# Patient Record
Sex: MALE | Race: BLACK OR AFRICAN AMERICAN | NOT HISPANIC OR LATINO | Employment: FULL TIME | URBAN - METROPOLITAN AREA
[De-identification: names, ages, dates, MRNs, and addresses within clinical notes are randomized per-mention and may not be internally consistent; named-entity substitution may affect disease eponyms.]

---

## 2019-01-06 ENCOUNTER — APPOINTMENT (OUTPATIENT)
Dept: CT IMAGING | Facility: CLINIC | Age: 34
End: 2019-01-06
Payer: COMMERCIAL

## 2019-01-06 ENCOUNTER — HOSPITAL ENCOUNTER (EMERGENCY)
Facility: CLINIC | Age: 34
Discharge: HOME | End: 2019-01-06
Attending: EMERGENCY MEDICINE | Admitting: EMERGENCY MEDICINE
Payer: COMMERCIAL

## 2019-01-06 VITALS
WEIGHT: 148 LBS | OXYGEN SATURATION: 99 % | HEIGHT: 67 IN | TEMPERATURE: 98 F | BODY MASS INDEX: 23.23 KG/M2 | RESPIRATION RATE: 20 BRPM | DIASTOLIC BLOOD PRESSURE: 57 MMHG | HEART RATE: 59 BPM | SYSTOLIC BLOOD PRESSURE: 111 MMHG

## 2019-01-06 DIAGNOSIS — R11.2 NON-INTRACTABLE VOMITING WITH NAUSEA, UNSPECIFIED VOMITING TYPE: ICD-10-CM

## 2019-01-06 DIAGNOSIS — R10.9 RIGHT SIDED ABDOMINAL PAIN: ICD-10-CM

## 2019-01-06 DIAGNOSIS — R50.9 FEVER, UNSPECIFIED FEVER CAUSE: ICD-10-CM

## 2019-01-06 LAB
ALBUMIN SERPL-MCNC: 3.4 G/DL (ref 3.4–5)
ALP SERPL-CCNC: 120 U/L (ref 40–150)
ALT SERPL W P-5'-P-CCNC: 27 U/L (ref 0–70)
ANION GAP SERPL CALCULATED.3IONS-SCNC: 8 MMOL/L (ref 3–14)
AST SERPL W P-5'-P-CCNC: 24 U/L (ref 0–45)
BASOPHILS # BLD AUTO: 0 10E9/L (ref 0–0.2)
BASOPHILS NFR BLD AUTO: 0.1 %
BILIRUB SERPL-MCNC: 0.4 MG/DL (ref 0.2–1.3)
BUN SERPL-MCNC: 8 MG/DL (ref 7–30)
CALCIUM SERPL-MCNC: 8.3 MG/DL (ref 8.5–10.1)
CHLORIDE SERPL-SCNC: 105 MMOL/L (ref 94–109)
CK SERPL-CCNC: 423 U/L (ref 30–300)
CO2 SERPL-SCNC: 26 MMOL/L (ref 20–32)
CREAT SERPL-MCNC: 0.93 MG/DL (ref 0.66–1.25)
DIFFERENTIAL METHOD BLD: NORMAL
EOSINOPHIL # BLD AUTO: 0.1 10E9/L (ref 0–0.7)
EOSINOPHIL NFR BLD AUTO: 1.4 %
ERYTHROCYTE [DISTWIDTH] IN BLOOD BY AUTOMATED COUNT: 13.3 % (ref 10–15)
GFR SERPL CREATININE-BSD FRML MDRD: >90 ML/MIN/{1.73_M2}
GLUCOSE SERPL-MCNC: 115 MG/DL (ref 70–99)
HCT VFR BLD AUTO: 45.6 % (ref 40–53)
HGB BLD-MCNC: 14.8 G/DL (ref 13.3–17.7)
IMM GRANULOCYTES # BLD: 0 10E9/L (ref 0–0.4)
IMM GRANULOCYTES NFR BLD: 0.3 %
LIPASE SERPL-CCNC: 86 U/L (ref 73–393)
LYMPHOCYTES # BLD AUTO: 1.3 10E9/L (ref 0.8–5.3)
LYMPHOCYTES NFR BLD AUTO: 14.4 %
MCH RBC QN AUTO: 28.5 PG (ref 26.5–33)
MCHC RBC AUTO-ENTMCNC: 32.5 G/DL (ref 31.5–36.5)
MCV RBC AUTO: 88 FL (ref 78–100)
MONOCYTES # BLD AUTO: 0.8 10E9/L (ref 0–1.3)
MONOCYTES NFR BLD AUTO: 9.1 %
NEUTROPHILS # BLD AUTO: 6.5 10E9/L (ref 1.6–8.3)
NEUTROPHILS NFR BLD AUTO: 74.7 %
NRBC # BLD AUTO: 0 10*3/UL
NRBC BLD AUTO-RTO: 0 /100
PLATELET # BLD AUTO: 323 10E9/L (ref 150–450)
POTASSIUM SERPL-SCNC: 3.4 MMOL/L (ref 3.4–5.3)
PROT SERPL-MCNC: 7.6 G/DL (ref 6.8–8.8)
RBC # BLD AUTO: 5.2 10E12/L (ref 4.4–5.9)
SODIUM SERPL-SCNC: 139 MMOL/L (ref 133–144)
WBC # BLD AUTO: 8.8 10E9/L (ref 4–11)

## 2019-01-06 PROCEDURE — 25000128 H RX IP 250 OP 636: Performed by: EMERGENCY MEDICINE

## 2019-01-06 PROCEDURE — 96374 THER/PROPH/DIAG INJ IV PUSH: CPT | Mod: 59

## 2019-01-06 PROCEDURE — 80053 COMPREHEN METABOLIC PANEL: CPT | Performed by: EMERGENCY MEDICINE

## 2019-01-06 PROCEDURE — 99284 EMERGENCY DEPT VISIT MOD MDM: CPT | Mod: 25

## 2019-01-06 PROCEDURE — 96361 HYDRATE IV INFUSION ADD-ON: CPT

## 2019-01-06 PROCEDURE — 83690 ASSAY OF LIPASE: CPT | Performed by: EMERGENCY MEDICINE

## 2019-01-06 PROCEDURE — 96375 TX/PRO/DX INJ NEW DRUG ADDON: CPT

## 2019-01-06 PROCEDURE — 82550 ASSAY OF CK (CPK): CPT | Performed by: EMERGENCY MEDICINE

## 2019-01-06 PROCEDURE — 85025 COMPLETE CBC W/AUTO DIFF WBC: CPT | Performed by: EMERGENCY MEDICINE

## 2019-01-06 PROCEDURE — 99285 EMERGENCY DEPT VISIT HI MDM: CPT | Mod: 25

## 2019-01-06 PROCEDURE — 74177 CT ABD & PELVIS W/CONTRAST: CPT

## 2019-01-06 RX ORDER — METOCLOPRAMIDE HYDROCHLORIDE 5 MG/ML
5 INJECTION INTRAMUSCULAR; INTRAVENOUS ONCE
Status: COMPLETED | OUTPATIENT
Start: 2019-01-06 | End: 2019-01-06

## 2019-01-06 RX ORDER — METOCLOPRAMIDE 10 MG/1
10 TABLET ORAL 4 TIMES DAILY PRN
Qty: 10 TABLET | Refills: 0 | Status: SHIPPED | OUTPATIENT
Start: 2019-01-06

## 2019-01-06 RX ORDER — KETOROLAC TROMETHAMINE 15 MG/ML
15 INJECTION, SOLUTION INTRAMUSCULAR; INTRAVENOUS ONCE
Status: COMPLETED | OUTPATIENT
Start: 2019-01-06 | End: 2019-01-06

## 2019-01-06 RX ORDER — ONDANSETRON 2 MG/ML
4 INJECTION INTRAMUSCULAR; INTRAVENOUS ONCE
Status: COMPLETED | OUTPATIENT
Start: 2019-01-06 | End: 2019-01-06

## 2019-01-06 RX ORDER — ONDANSETRON 2 MG/ML
INJECTION INTRAMUSCULAR; INTRAVENOUS
Status: DISCONTINUED
Start: 2019-01-06 | End: 2019-01-06

## 2019-01-06 RX ORDER — ONDANSETRON 2 MG/ML
4 INJECTION INTRAMUSCULAR; INTRAVENOUS ONCE
Status: DISCONTINUED | OUTPATIENT
Start: 2019-01-06 | End: 2019-01-06 | Stop reason: HOSPADM

## 2019-01-06 RX ORDER — IOPAMIDOL 755 MG/ML
500 INJECTION, SOLUTION INTRAVASCULAR ONCE
Status: COMPLETED | OUTPATIENT
Start: 2019-01-06 | End: 2019-01-06

## 2019-01-06 RX ORDER — ONDANSETRON 4 MG/1
4-8 TABLET, ORALLY DISINTEGRATING ORAL EVERY 8 HOURS PRN
Qty: 10 TABLET | Refills: 0 | Status: SHIPPED | OUTPATIENT
Start: 2019-01-06 | End: 2019-01-09

## 2019-01-06 RX ADMIN — ONDANSETRON 4 MG: 2 INJECTION INTRAMUSCULAR; INTRAVENOUS at 12:53

## 2019-01-06 RX ADMIN — ONDANSETRON 4 MG: 2 INJECTION INTRAMUSCULAR; INTRAVENOUS at 12:41

## 2019-01-06 RX ADMIN — SODIUM CHLORIDE 1000 ML: 9 INJECTION, SOLUTION INTRAVENOUS at 12:53

## 2019-01-06 RX ADMIN — SODIUM CHLORIDE 59 ML: 9 INJECTION, SOLUTION INTRAVENOUS at 14:09

## 2019-01-06 RX ADMIN — IOPAMIDOL 74 ML: 755 INJECTION, SOLUTION INTRAVENOUS at 14:09

## 2019-01-06 RX ADMIN — SODIUM CHLORIDE 1000 ML: 9 INJECTION, SOLUTION INTRAVENOUS at 12:21

## 2019-01-06 RX ADMIN — METOCLOPRAMIDE 5 MG: 5 INJECTION, SOLUTION INTRAMUSCULAR; INTRAVENOUS at 13:49

## 2019-01-06 RX ADMIN — KETOROLAC TROMETHAMINE 15 MG: 15 INJECTION, SOLUTION INTRAMUSCULAR; INTRAVENOUS at 12:21

## 2019-01-06 ASSESSMENT — ENCOUNTER SYMPTOMS
NAUSEA: 1
CHILLS: 1
APPETITE CHANGE: 1
HEMATURIA: 0
VOMITING: 1
DIAPHORESIS: 1
CONSTIPATION: 1
ABDOMINAL PAIN: 1
DIARRHEA: 0
FEVER: 1
DYSURIA: 0

## 2019-01-06 ASSESSMENT — MIFFLIN-ST. JEOR: SCORE: 1584.95

## 2019-01-06 NOTE — ED PROVIDER NOTES
"  History     Chief Complaint:    Vomiting and Abdominal Pain    HPI   Johnson Tafoya Jr. is a 33 year old male who presents with vomiting and abdominal pain. The patient reports that on 1/2/19, he started to develop nausea, vomiting associated with diffuse abdominal pain, cough, body aches, hot and cold flashes, and a fever. He has had a decreased appetite due to symptoms, constipation, and lost 4 pounds. This morning the patient had around 10 episodes of emesis around 0600. The patient denies hematemesis, diarrhea, dysuria, hematuria, recent parties or heavy alcohol use. Of note, his 8 month old daughter has RSV currently.    Allergies:  No known drug allergies.       Medications:    No current medications.     Past Medical History:    Medical history reviewed. No pertinent medical history.      Past Surgical History:    Past surgical history reviewed. No pertinent past surgical history.     Family History:    Family history reviewed. No pertinent family history.     Social History:  The patient was accompanied to the ED by himself.  Smoking Status: Current Every Day Smoke  Alcohol Use: Positive  Marital Status:  Single      Review of Systems   Constitutional: Positive for appetite change, chills, diaphoresis and fever.        Body aches   Gastrointestinal: Positive for abdominal pain, constipation, nausea and vomiting. Negative for diarrhea.   Genitourinary: Negative for dysuria and hematuria.   All other systems reviewed and are negative.    Physical Exam     Patient Vitals for the past 24 hrs:   BP Temp Temp src Pulse Resp SpO2 Height Weight   01/06/19 1430 116/72 -- -- 71 -- 99 % -- --   01/06/19 1351 -- 98  F (36.7  C) Oral -- -- -- -- --   01/06/19 1330 119/73 -- -- 65 -- 98 % -- --   01/06/19 1245 116/73 -- -- 71 -- 96 % -- --   01/06/19 1230 126/69 -- -- 74 -- 98 % -- --   01/06/19 1144 130/90 100.7  F (38.2  C) Temporal 101 20 99 % 1.702 m (5' 7\") 67.1 kg (148 lb)      Physical Exam  Eyes:               " Sclera white; Pupils are equal and round  ENT:                External ears and nares normal, oropharynx normal, mucous membranes moist  CV:                  Rate as above with regular rhythm   Resp:               Breath sounds clear and equal bilaterally                          Non-labored, no retractions or accessory muscle use  GI:                   Abdomen is soft, non-distended, mild diffuse tenderness worst in RUQ but negative Sultana's                          No rebound tenderness or peritoneal features  MS:                  Moves all extremities  Skin:                Warm and dry  Neuro:             Speech is normal and fluent. No apparent deficit.    Emergency Department Course     Imaging:  Radiology findings were communicated with the patient who voiced understanding of the findings.    CT Abdomen Pelvis w Contrast  1. No specific cause for right sided abdominal pain demonstrated.    SIMONE GALLEGO MD  Reading per radiology     Laboratory:  Laboratory findings were communicated with the patient who voiced understanding of the findings.    CBC: WBC 8.8, HGB 14.8,   CMP: Glucose 115 (H), Calcium 8.3 (L) o/w WNL (Creatinine 0.93)  Lipase: 86  CK Total: 423 (H)    Interventions:  1221 NS 1000 mL IV  1221 Toradol 15 mg   1241 Zofran 4 mg IV  1243 Zofran 4 mg IV  1253 NS 1000 mL IV  1349 Reglan 5 mg IV  1409 NS 59 ml IV    Emergency Department Course:     Nursing notes and vitals reviewed.    1210 IV was inserted and blood was drawn for laboratory testing, results above.     1225 I performed an exam of the patient as documented above.     1408 The patient was sent for a CT while in the emergency department, results above.      1455 I personally reviewed the imaging and lab results with the patient and answered all related questions prior to discharge.    Impression & Plan      Medical Decision Making:  Johnson Tafoya JrCathie is a 33 year old male who presents to the emergency department today for evaluation  of vomiting, abdominal pain, and was noted to have a fever here. Differential includes gastroenteritis, hepatitis, pancreatitis, biliary pathology, and possibly appendicitis. Based on the diffuse nature of his discomfort, work up was initially started with blood work and symptomatic treatments. We had difficulty sufficiently controlling his nausea and work up was expanding with CT scan. This did not reveal obstruction, appendicitis, or any clear etiology of his discomfort. I therefore suspect his symptoms are likely viral in etiology.  Discussed influenza like illness but is too far into illness to consider Tamiflu.  He was ultimately able to PO challenge and will be discharged with dual emetics.     Diagnosis:    ICD-10-CM    1. Right sided abdominal pain R10.9    2. Non-intractable vomiting with nausea, unspecified vomiting type R11.2    3. Fever, unspecified fever cause R50.9      Disposition:   The patient is discharged to home.     Discharge Medications:    START taking      Dose / Directions   metoclopramide 10 MG tablet  Commonly known as:  REGLAN      Dose:  10 mg  Take 1 tablet (10 mg) by mouth 4 times daily as needed (nausea/vomiting)  Quantity:  10 tablet  Refills:  0     ondansetron 4 MG ODT tab  Commonly known as:  ZOFRAN ODT      Dose:  4-8 mg  Take 1-2 tablets (4-8 mg) by mouth every 8 hours as needed for nausea  Quantity:  10 tablet  Refills:  0           Where to get your medicines      Some of these will need a paper prescription and others can be bought over the counter. Ask your nurse if you have questions.    Bring a paper prescription for each of these medications    metoclopramide 10 MG tablet    ondansetron 4 MG ODT tab         Scribe Disclosure:  I, Orla Severson, am serving as a scribe at 12:19 PM on 1/6/2019 to document services personally performed by Shirin Manuel MD based on my observations and the provider's statements to me.     [facility] EMERGENCY  DEPARTMENT       [facility], Shirin Jennings MD  01/06/19 3571

## 2019-01-06 NOTE — LETTER
[facility] EMERGENCY DEPARTMENT  [address]  [facility] [address]  [phone]    Johnson Tafoya   [address]  [facility] [address]  [phone] (home) none (work)    : 1985      To Whom it may concern:    Johnson Tafoya was seen in our Emergency Department today, 2019.  Return to work is based on symptom improvement expected in 1-3 days.    Sincerely,      Shirin Manuel MD

## 2019-01-06 NOTE — ED AVS SNAPSHOT
[facility] Emergency Department  [address]  [facility] [address]  Phone:  [phone]  Fax:  [phone]                                    Johnson Tafoya Jr.   MRN: [id number]    Department:  [facility] Emergency Department   Date of Visit:  1/6/2019           After Visit Summary Signature Page    I have received my discharge instructions, and my questions have been answered. I have discussed any challenges I see with this plan with the nurse or doctor.    ..........................................................................................................................................  Patient/Patient Representative Signature      ..........................................................................................................................................  Patient Representative Print Name and Relationship to Patient    ..................................................               ................................................  Date                                   Time    ..........................................................................................................................................  Reviewed by Signature/Title    ...................................................              ..............................................  Date                                               Time          22EPIC Rev 08/18

## 2019-01-06 NOTE — DISCHARGE INSTRUCTIONS
Discharge Instructions  Vomiting    You have been seen today for vomiting (throwing up). This is usually caused by a virus, but some bacteria, parasites, medicines or other medical conditions can cause similar symptoms. At this time your provider does not find that your vomiting is a sign of anything dangerous or life-threatening. However, sometimes the signs of serious illness do not show up right away. If you have new or worse symptoms, you may need to be seen again in the Emergency Department or by your primary provider. Remember that serious problems like appendicitis can start as vomiting.    Generally, every Emergency Department visit should have a follow-up clinic visit with either a primary or a specialty clinic/provider. Please follow-up as instructed by your emergency provider today.    Return to the Emergency Department if:  You keep vomiting and you are not able to keep liquids down.   You feel you are getting dehydrated, such as being very thirsty, not urinating (peeing) at least every 8-12 hours, or feeling faint or lightheaded.   You develop a new fever, or your fever continues for more than 2 days.   You have abdominal (belly pain) that seems worse than cramps, is in one spot, or is getting worse over time. Appendicitis usually causes pain in the right lower abdomen (to the right and below your belly button) so watch for pain in this location.  You have blood in your vomit or stools.   You feel very weak.  You are not starting to improve within 24 hours of your visit here.     What can I do to help myself?  The most important thing to do is to drink clear liquids. If you have been vomiting a lot, it is best to have only small, frequent sips of liquids. Drinking too much at once may cause more vomiting. If you are vomiting often, you must replace minerals, sodium and potassium lost with your illness. Pedialyte  is the best available rehydration liquid but some find that it doesn?t taste good so sports  drinks are an alterative. You can also drink clear liquids such as water, weak tea, apple juice, and 7-Up . Avoid acid liquids (orange), caffeine (coffee) or alcohol. Do not drink milk until you no longer have diarrhea (loose stools).   After liquids are staying down, you may start eating mild foods. Soda crackers, toast, plain noodles, gelatin, applesauce and bananas are good first choices. Avoid foods that have acid, are spicy, fatty or have a lot of fiber (such as meats, coarse grains, vegetables). You may start eating these foods again in about 3 days when you are better.   Sometimes treatment includes prescription medicine to prevent nausea (sick to your stomach) and vomiting. If your provider prescribes these for you, take them as directed.   Do not take ibuprofen, naproxen, or other nonsteroidal anti-inflammatory (NSAID) medicines without checking with your healthcare provider.     If you were given a prescription for medicine here today, be sure to read all of the information (including the package insert) that comes with your prescription.  This will include important information about the medicine, its side effects, and any warnings that you need to know about.  The pharmacist who fills the prescription can provide more information and answer questions you may have about the medicine.  If you have questions or concerns that the pharmacist cannot address, please call or return to the Emergency Department.     Remember that you can always come back to the Emergency Department if you are not able to see your regular provider in the amount of time listed above, if you get any new symptoms, or if there is anything that worries you.    Discharge Instructions  Abdominal Pain    Abdominal pain (belly pain) can be caused by many things. Your evaluation today does not show the exact cause for your pain. Your provider today has decided that it is unlikely your pain is due to a life threatening problem, or a problem  requiring surgery or hospital admission. Sometimes those problems cannot be found right away, so it is very important that you follow up as directed.  Sometimes only the changes which occur over time allow the cause of your pain to be found.    Generally, every Emergency Department visit should have a follow-up clinic visit with either a primary or a specialty clinic/provider. Please follow-up as instructed by your emergency provider today. With abdominal pain, we often recommend very close follow-up, such as the following day.    ADULTS:  Return to the Emergency Department right away if:    You get an oral temperature above 102oF or as directed by your provider.  You have blood in your stools. This may be bright red or appear as black, tarry stools.    You keep vomiting (throwing up) or cannot drink liquids.  You see blood when you vomit.   You cannot have a bowel movement or you cannot pass gas.  Your stomach gets bloated or bigger.  Your skin or the whites of your eyes look yellow.  You faint.  You have bloody, frequent or painful urination (peeing).  You have new symptoms or anything that worries you.    CHILDREN:  Return to the Emergency Department right away if your child has any of the above-listed symptoms or the following:    Pushes your hand away or screams/cries when his/her belly is touched.  You notice your child is very fussy or weak.  Your child is very tired and is too tired to eat or drink.  Your child is dehydrated.  Signs of dehydration can be:  Significant change in the amount of wet diapers/urine.  Your infant or child starts to have dry mouth and lips, or no saliva (spit) or tears.    PREGNANT WOMEN:  Return to the Emergency Department right away if you have any of the above-listed symptoms or the following:    You have bleeding, leaking fluid or passing tissue from the vagina.  You have worse pain or cramping, or pain in your shoulder or back.  You have vomiting that will not stop.  You have a  temperature of 100oF or more.  Your baby is not moving as much as usual.  You faint.  You get a bad headache with or without eye problems and abdominal pain.  You have a seizure.  You have unusual discharge from your vagina and abdominal pain.    Abdominal pain is pretty common during pregnancy.  Your pain may or may not be related to your pregnancy. You should follow-up closely with your OB provider so they can evaluate you and your baby.  Until you follow-up with your regular provider, do the following:     Avoid sex and do not put anything in your vagina.  Drink clear fluids.  Only take medications approved by your provider.    MORE INFORMATION:    Appendicitis:  A possible cause of abdominal pain in any person who still has their appendix is acute appendicitis. Appendicitis is often hard to diagnose.  Testing does not always rule out early appendicitis or other causes of abdominal pain. Close follow-up with your provider and re-evaluations may be needed to figure out the reason for your abdominal pain.    Follow-up:  It is very important that you make an appointment with your clinic and go to the appointment.  If you do not follow-up with your primary provider, it may result in missing an important development which could result in permanent injury or disability and/or lasting pain.  If there is any problem keeping your appointment, call your provider or return to the Emergency Department.    Medications:  Take your medications as directed by your provider today.  Before using over-the-counter medications, ask your provider and make sure to take the medications as directed.  If you have any questions about medications, ask your provider.    Diet:  Resume your normal diet as much as possible, but do not eat fried, fatty or spicy foods while you have pain.  Do not drink alcohol or have caffeine.  Do not smoke tobacco.    Probiotics: If you have been given an antibiotic, you may want to also take a probiotic pill  "or eat yogurt with live cultures. Probiotics have \"good bacteria\" to help your intestines stay healthy. Studies have shown that probiotics help prevent diarrhea (loose stools) and other intestine problems (including C. diff infection) when you take antibiotics. You can buy these without a prescription in the pharmacy section of the store.     If you were given a prescription for medicine here today, be sure to read all of the information (including the package insert) that comes with your prescription.  This will include important information about the medicine, its side effects, and any warnings that you need to know about.  The pharmacist who fills the prescription can provide more information and answer questions you may have about the medicine.  If you have questions or concerns that the pharmacist cannot address, please call or return to the Emergency Department.       Remember that you can always come back to the Emergency Department if you are not able to see your regular provider in the amount of time listed above, if you get any new symptoms, or if there is anything that worries you.    "

## 2019-01-06 NOTE — ED TRIAGE NOTES
Abdominal pain, fever, vomiting, cough and headache started on Wednesday. ABC intact alert and no distress.

## 2019-12-23 ENCOUNTER — HOSPITAL ENCOUNTER (EMERGENCY)
Facility: CLINIC | Age: 34
Discharge: HOME | End: 2019-12-23
Attending: EMERGENCY MEDICINE | Admitting: EMERGENCY MEDICINE

## 2019-12-23 VITALS
HEART RATE: 55 BPM | RESPIRATION RATE: 16 BRPM | HEIGHT: 66 IN | OXYGEN SATURATION: 100 % | TEMPERATURE: 98.2 F | BODY MASS INDEX: 22.5 KG/M2 | SYSTOLIC BLOOD PRESSURE: 112 MMHG | DIASTOLIC BLOOD PRESSURE: 77 MMHG | WEIGHT: 140 LBS

## 2019-12-23 DIAGNOSIS — R10.13 EPIGASTRIC PAIN: ICD-10-CM

## 2019-12-23 LAB
ALBUMIN SERPL-MCNC: 3.8 G/DL (ref 3.4–5)
ALBUMIN UR-MCNC: 20 MG/DL
ALP SERPL-CCNC: 120 U/L (ref 40–150)
ALT SERPL W P-5'-P-CCNC: 31 U/L (ref 0–70)
ANION GAP SERPL CALCULATED.3IONS-SCNC: 5 MMOL/L (ref 3–14)
APPEARANCE UR: CLEAR
AST SERPL W P-5'-P-CCNC: 33 U/L (ref 0–45)
BASOPHILS # BLD AUTO: 0 10E9/L (ref 0–0.2)
BASOPHILS NFR BLD AUTO: 0.3 %
BILIRUB SERPL-MCNC: 0.6 MG/DL (ref 0.2–1.3)
BILIRUB UR QL STRIP: NEGATIVE
BUN SERPL-MCNC: 14 MG/DL (ref 7–30)
CALCIUM SERPL-MCNC: 8.9 MG/DL (ref 8.5–10.1)
CHLORIDE SERPL-SCNC: 107 MMOL/L (ref 94–109)
CO2 SERPL-SCNC: 27 MMOL/L (ref 20–32)
COLOR UR AUTO: YELLOW
CREAT SERPL-MCNC: 0.73 MG/DL (ref 0.66–1.25)
DIFFERENTIAL METHOD BLD: NORMAL
EOSINOPHIL # BLD AUTO: 0.1 10E9/L (ref 0–0.7)
EOSINOPHIL NFR BLD AUTO: 1.7 %
ERYTHROCYTE [DISTWIDTH] IN BLOOD BY AUTOMATED COUNT: 13.7 % (ref 10–15)
GFR SERPL CREATININE-BSD FRML MDRD: >90 ML/MIN/{1.73_M2}
GLUCOSE SERPL-MCNC: 102 MG/DL (ref 70–99)
GLUCOSE UR STRIP-MCNC: NEGATIVE MG/DL
HCT VFR BLD AUTO: 45.6 % (ref 40–53)
HGB BLD-MCNC: 14.4 G/DL (ref 13.3–17.7)
HGB UR QL STRIP: ABNORMAL
IMM GRANULOCYTES # BLD: 0 10E9/L (ref 0–0.4)
IMM GRANULOCYTES NFR BLD: 0.1 %
KETONES UR STRIP-MCNC: NEGATIVE MG/DL
LEUKOCYTE ESTERASE UR QL STRIP: NEGATIVE
LIPASE SERPL-CCNC: 97 U/L (ref 73–393)
LYMPHOCYTES # BLD AUTO: 2.2 10E9/L (ref 0.8–5.3)
LYMPHOCYTES NFR BLD AUTO: 28 %
MCH RBC QN AUTO: 27.5 PG (ref 26.5–33)
MCHC RBC AUTO-ENTMCNC: 31.6 G/DL (ref 31.5–36.5)
MCV RBC AUTO: 87 FL (ref 78–100)
MONOCYTES # BLD AUTO: 0.7 10E9/L (ref 0–1.3)
MONOCYTES NFR BLD AUTO: 8.6 %
MUCOUS THREADS #/AREA URNS LPF: PRESENT /LPF
NEUTROPHILS # BLD AUTO: 4.7 10E9/L (ref 1.6–8.3)
NEUTROPHILS NFR BLD AUTO: 61.3 %
NITRATE UR QL: NEGATIVE
NRBC # BLD AUTO: 0 10*3/UL
NRBC BLD AUTO-RTO: 0 /100
PH UR STRIP: 7 PH (ref 5–7)
PLATELET # BLD AUTO: 343 10E9/L (ref 150–450)
POTASSIUM SERPL-SCNC: 3.9 MMOL/L (ref 3.4–5.3)
PROT SERPL-MCNC: 7.6 G/DL (ref 6.8–8.8)
RBC # BLD AUTO: 5.24 10E12/L (ref 4.4–5.9)
RBC #/AREA URNS AUTO: 5 /HPF (ref 0–2)
SODIUM SERPL-SCNC: 138 MMOL/L (ref 133–144)
SOURCE: ABNORMAL
SP GR UR STRIP: 1.03 (ref 1–1.03)
SQUAMOUS #/AREA URNS AUTO: <1 /HPF (ref 0–1)
UROBILINOGEN UR STRIP-MCNC: NORMAL MG/DL (ref 0–2)
WBC # BLD AUTO: 7.7 10E9/L (ref 4–11)
WBC #/AREA URNS AUTO: 3 /HPF (ref 0–5)

## 2019-12-23 PROCEDURE — 81001 URINALYSIS AUTO W/SCOPE: CPT | Performed by: EMERGENCY MEDICINE

## 2019-12-23 PROCEDURE — 85025 COMPLETE CBC W/AUTO DIFF WBC: CPT | Performed by: EMERGENCY MEDICINE

## 2019-12-23 PROCEDURE — 83690 ASSAY OF LIPASE: CPT | Performed by: EMERGENCY MEDICINE

## 2019-12-23 PROCEDURE — 99283 EMERGENCY DEPT VISIT LOW MDM: CPT | Mod: 25

## 2019-12-23 PROCEDURE — 96360 HYDRATION IV INFUSION INIT: CPT

## 2019-12-23 PROCEDURE — 25000132 ZZH RX MED GY IP 250 OP 250 PS 637: Performed by: EMERGENCY MEDICINE

## 2019-12-23 PROCEDURE — 80053 COMPREHEN METABOLIC PANEL: CPT | Performed by: EMERGENCY MEDICINE

## 2019-12-23 PROCEDURE — 25800030 ZZH RX IP 258 OP 636: Performed by: EMERGENCY MEDICINE

## 2019-12-23 PROCEDURE — 25000125 ZZHC RX 250: Performed by: EMERGENCY MEDICINE

## 2019-12-23 RX ORDER — FAMOTIDINE 10 MG
10 TABLET ORAL 2 TIMES DAILY
Qty: 14 TABLET | Refills: 0 | Status: SHIPPED | OUTPATIENT
Start: 2019-12-23 | End: 2019-12-30

## 2019-12-23 RX ORDER — SODIUM CHLORIDE 9 MG/ML
1000 INJECTION, SOLUTION INTRAVENOUS CONTINUOUS
Status: DISCONTINUED | OUTPATIENT
Start: 2019-12-23 | End: 2019-12-23 | Stop reason: HOSPADM

## 2019-12-23 RX ORDER — FAMOTIDINE 10 MG
10 TABLET ORAL ONCE
Status: COMPLETED | OUTPATIENT
Start: 2019-12-23 | End: 2019-12-23

## 2019-12-23 RX ADMIN — LIDOCAINE HYDROCHLORIDE 30 ML: 20 SOLUTION ORAL; TOPICAL at 10:54

## 2019-12-23 RX ADMIN — SODIUM CHLORIDE 1000 ML: 9 INJECTION, SOLUTION INTRAVENOUS at 10:24

## 2019-12-23 RX ADMIN — FAMOTIDINE 10 MG: 10 TABLET, FILM COATED ORAL at 10:54

## 2019-12-23 ASSESSMENT — ENCOUNTER SYMPTOMS
DIFFICULTY URINATING: 0
HEMATURIA: 0
CONSTIPATION: 1
FEVER: 0
CHILLS: 1
NAUSEA: 1
VOMITING: 0
BLOOD IN STOOL: 0
APPETITE CHANGE: 0
ABDOMINAL PAIN: 1
DYSURIA: 0

## 2019-12-23 ASSESSMENT — MIFFLIN-ST. JEOR: SCORE: 1527.79

## 2019-12-23 NOTE — ED PROVIDER NOTES
History     Chief Complaint:  Abdominal Pain     HPI  Johnson Tafoya Jr. is a 34 year old male who presents today with abdominal pain. The patient states that 2 days ago after taking DayQuil for rhinorrhea that has since improved, he had sudden onset of epigastric pain with associated nausea, chills that has persisted. He states that the pain is intermittent, lasting an hour each episode then totally is relieved. He states no exacerbating factors and food improves the pain. He states associated constipation the last few days, with his last bowel movement yesterday. He states he has never had pain like this before. He states he take ibuprofen for headache and tooth aches, but not daily. He denies chest pain, vomiting, black/bloody stools, fever, urine issues or decreased appetite. He denies a personal history of ulcers, pancreatitis or gallbladder issues. He denies family history of early heart disease. He states he smoke a couple cigarettes a day.     Allergies:  No Known Allergies     Medications:    No daily medications  Prn ibuprofen    Past Medical History:    History reviewed. No pertinent past medical history.    Past Surgical History:    History reviewed. No pertinent surgical history.    Family History:    History reviewed. No pertinent family history.     Social History:  The patient was accompanied to the ED alone.  Smoking Status: Current  Alcohol Use: Yes   PCP: [facility]   Marital Status: Single     Review of Systems   Constitutional: Positive for chills. Negative for appetite change and fever.   Cardiovascular: Negative for chest pain.   Gastrointestinal: Positive for abdominal pain, constipation and nausea. Negative for blood in stool and vomiting.   Genitourinary: Negative for difficulty urinating, dysuria and hematuria.   All other systems reviewed and are negative.     Physical Exam     Patient Vitals for the past 24 hrs:   BP Temp Temp src Pulse Heart Rate Resp SpO2 Height Weight  "  12/23/19 1030 112/77 -- -- 55 -- -- 100 % -- --   12/23/19 1015 127/76 -- -- -- -- -- -- -- --   12/23/19 0939 124/86 98.2  F (36.8  C) Oral -- 95 16 99 % 1.676 m (5' 6\") 63.5 kg (140 lb)        Physical Exam  General: Adult male sitting upright  Eyes: PERRL, Conjunctive within normal limits. No scleral icterus.  ENT: Moist mucous membranes, oropharynx clear.   CV: Normal S1S2, no murmur, rub or gallop. Regular rate and rhythm  Resp: Clear to auscultation bilaterally, no wheezes, rales or rhonchi. Normal respiratory effort.  GI: Abdomen is soft and nondistended. Mild epigastric tenderness to deep palpation. No palpable masses. No rebound or guarding. No CVA tenderness to percussion.  MSK: No edema. Nontender. Normal active range of motion.  Skin: Warm and dry. No rashes or lesions or ecchymoses on visible skin.  Neuro: Alert and oriented. Responds appropriately to all questions and commands. No focal findings appreciated. Normal muscle tone.  Psych: Normal mood and affect. Pleasant.    Emergency Department Course     Laboratory:  Laboratory results were communicated with the patient who voiced understanding of the findings.    UA: Blood: trace, RBC/HPF: 5 (H), Mucous: present, o/w Negative   CBC: WBC: 7.7, HGB: 14.4, PLT: 343  CMP: Glucose: 102 (H), o/w WNL (Creatinine: 0.73)  Lipase: 97     Interventions:  1024 NS 1,000 mLs IV  1054 GI cocktail 30 mL PO  1054 Pepcid 10 mg PO     Emergency Department Course:  Nursing notes and vitals reviewed. (10:14 AM) I performed an exam of the patient as documented above.     IV inserted, blood and Urine sent to the lab for further testing, results above.     Medicine administered as documented above.    1110 I rechecked the patient and discussed the results of their workup thus far. Patient states his pain was relieved with interventions noted above.    Findings and plan explained to the Patient. Patient discharged home with instructions regarding supportive care, " medications, and reasons to return. The importance of close follow-up was reviewed. The patient was prescribed Pepcid.    Impression & Plan       Medical Decision Making:  This patient presented to the Emergency Department with epi-gastric Abdominal Pain.  The clinical exam today is non-specific and non-focal and non-surgical.  The laboratory testing has returned normal. The patient does have a history of pancreatitis and states this pain does feel very similar in nature.  Her Lipase was 197, all labs and urine was unremarkable today. She did have improvement in condition after the GI cocktail and her pain was then 3 out of 10.  The exact etiology of the abdominal pain is not clear but is suggestive of gastritis or PUD. The history, physical exam, and results detect no life threatening cause at this time, nor do they indicate the patient is currently suffering from an acute surgical or life threatening process.  The patient is advised to seek immediate re-evaluation in the ED if there is a worsening of the condition, and to be seen by a more consistent care-giver, such as their PMD, if the symptoms persist more than one day.  He is recommended to avoid ibuprofen, alcohol, spicy/greasy/fatty foods as well as quit smoking. Pepcid, Tums, Maalox all are reasonable to use.  All questions were answered,  will return for any change in condition, discharged in stable condition.     Diagnosis:    ICD-10-CM    1. Epigastric pain R10.13       Disposition:  Discharged to home.    Discharge Medications:  New Prescriptions    FAMOTIDINE (PEPCID) 10 MG TABLET    Take 1 tablet (10 mg) by mouth 2 times daily for 7 days     Scribe Disclosure:  Antonio DALLAS, am serving as a scribe at 10:14 AM on 12/23/2019 to document services personally performed by Pretty Pleitez MD based on my observations and the provider's statements to me.      12/23/2019   [facility] EMERGENCY DEPARTMENT       Pretty Pleitez,  MD  12/23/19 [id number]

## 2019-12-23 NOTE — ED TRIAGE NOTES
Diffuse abdominal pain since Saturday.  Patient alert and oriented x3.  Airway, breathing and circulation intact.

## 2019-12-23 NOTE — LETTER
December 23, 2019      To Whom It May Concern:      Johnson Michelet Adames was seen in our Emergency Department today, 12/23/19.  I expect his condition to improve over the next 2 days.  He may return to work/school when improved.    Sincerely,        Registered Nurse

## 2019-12-23 NOTE — ED AVS SNAPSHOT
[facility] Emergency Department  [address]  [facility] [address]  Phone:  [phone]  Fax:  [phone]                                    Johnson Tafoya Jr.   MRN: [id number]    Department:  [facility] Emergency Department   Date of Visit:  12/23/2019           After Visit Summary Signature Page    I have received my discharge instructions, and my questions have been answered. I have discussed any challenges I see with this plan with the nurse or doctor.    ..........................................................................................................................................  Patient/Patient Representative Signature      ..........................................................................................................................................  Patient Representative Print Name and Relationship to Patient    ..................................................               ................................................  Date                                   Time    ..........................................................................................................................................  Reviewed by Signature/Title    ...................................................              ..............................................  Date                                               Time          22EPIC Rev 08/18

## 2023-05-26 ENCOUNTER — OFFICE VISIT (OUTPATIENT)
Dept: URGENT CARE | Facility: URGENT CARE | Age: 38
End: 2023-05-26
Payer: COMMERCIAL

## 2023-05-26 VITALS
RESPIRATION RATE: 16 BRPM | HEART RATE: 75 BPM | TEMPERATURE: 98 F | HEIGHT: 67 IN | BODY MASS INDEX: 24.33 KG/M2 | DIASTOLIC BLOOD PRESSURE: 82 MMHG | WEIGHT: 155 LBS | SYSTOLIC BLOOD PRESSURE: 130 MMHG | OXYGEN SATURATION: 100 %

## 2023-05-26 DIAGNOSIS — R00.2 PALPITATIONS: Primary | ICD-10-CM

## 2023-05-26 DIAGNOSIS — B35.4 TINEA CORPORIS: ICD-10-CM

## 2023-05-26 DIAGNOSIS — F43.9 SITUATIONAL STRESS: ICD-10-CM

## 2023-05-26 LAB
ALBUMIN SERPL BCG-MCNC: 4.9 G/DL (ref 3.5–5.2)
ALP SERPL-CCNC: 127 U/L (ref 40–129)
ALT SERPL W P-5'-P-CCNC: 25 U/L (ref 10–50)
ANION GAP SERPL CALCULATED.3IONS-SCNC: 10 MMOL/L (ref 7–15)
AST SERPL W P-5'-P-CCNC: 26 U/L (ref 10–50)
BASOPHILS # BLD AUTO: 0 10E3/UL (ref 0–0.2)
BASOPHILS NFR BLD AUTO: 0 %
BILIRUB SERPL-MCNC: 0.4 MG/DL
BUN SERPL-MCNC: 11.4 MG/DL (ref 6–20)
CALCIUM SERPL-MCNC: 9.7 MG/DL (ref 8.6–10)
CHLORIDE SERPL-SCNC: 105 MMOL/L (ref 98–107)
CREAT SERPL-MCNC: 0.84 MG/DL (ref 0.67–1.17)
DEPRECATED HCO3 PLAS-SCNC: 25 MMOL/L (ref 22–29)
EOSINOPHIL # BLD AUTO: 0.2 10E3/UL (ref 0–0.7)
EOSINOPHIL NFR BLD AUTO: 2 %
ERYTHROCYTE [DISTWIDTH] IN BLOOD BY AUTOMATED COUNT: 14.1 % (ref 10–15)
GFR SERPL CREATININE-BSD FRML MDRD: >90 ML/MIN/1.73M2
GLUCOSE SERPL-MCNC: 100 MG/DL (ref 70–99)
HCT VFR BLD AUTO: 46.6 % (ref 40–53)
HGB BLD-MCNC: 14.9 G/DL (ref 13.3–17.7)
IMM GRANULOCYTES # BLD: 0 10E3/UL
IMM GRANULOCYTES NFR BLD: 0 %
LYMPHOCYTES # BLD AUTO: 2.5 10E3/UL (ref 0.8–5.3)
LYMPHOCYTES NFR BLD AUTO: 27 %
MAGNESIUM SERPL-MCNC: 2.1 MG/DL (ref 1.7–2.3)
MCH RBC QN AUTO: 28.5 PG (ref 26.5–33)
MCHC RBC AUTO-ENTMCNC: 32 G/DL (ref 31.5–36.5)
MCV RBC AUTO: 89 FL (ref 78–100)
MONOCYTES # BLD AUTO: 0.7 10E3/UL (ref 0–1.3)
MONOCYTES NFR BLD AUTO: 7 %
NEUTROPHILS # BLD AUTO: 6 10E3/UL (ref 1.6–8.3)
NEUTROPHILS NFR BLD AUTO: 64 %
PLATELET # BLD AUTO: 315 10E3/UL (ref 150–450)
POTASSIUM SERPL-SCNC: 4.8 MMOL/L (ref 3.4–5.3)
PROT SERPL-MCNC: 7.7 G/DL (ref 6.4–8.3)
RBC # BLD AUTO: 5.22 10E6/UL (ref 4.4–5.9)
SODIUM SERPL-SCNC: 140 MMOL/L (ref 136–145)
TSH SERPL DL<=0.005 MIU/L-ACNC: 0.78 UIU/ML (ref 0.3–4.2)
WBC # BLD AUTO: 9.4 10E3/UL (ref 4–11)

## 2023-05-26 PROCEDURE — 80050 GENERAL HEALTH PANEL: CPT | Performed by: FAMILY MEDICINE

## 2023-05-26 PROCEDURE — 83735 ASSAY OF MAGNESIUM: CPT | Performed by: FAMILY MEDICINE

## 2023-05-26 PROCEDURE — 36415 COLL VENOUS BLD VENIPUNCTURE: CPT | Performed by: FAMILY MEDICINE

## 2023-05-26 PROCEDURE — 93000 ELECTROCARDIOGRAM COMPLETE: CPT | Performed by: FAMILY MEDICINE

## 2023-05-26 PROCEDURE — 99204 OFFICE O/P NEW MOD 45 MIN: CPT | Performed by: FAMILY MEDICINE

## 2023-05-26 RX ORDER — KETOCONAZOLE 20 MG/G
CREAM TOPICAL DAILY
Qty: 30 G | Refills: 0 | Status: SHIPPED | OUTPATIENT
Start: 2023-05-26

## 2023-05-26 RX ORDER — HYDROXYZINE PAMOATE 25 MG/1
25 CAPSULE ORAL 3 TIMES DAILY PRN
Qty: 30 CAPSULE | Refills: 0 | Status: SHIPPED | OUTPATIENT
Start: 2023-05-26

## 2023-05-26 NOTE — PROGRESS NOTES
SUBJECTIVE:  Chief Complaint   Patient presents with     Urgent Care     Feeling a lot of stress, overwhelmed, anxiety, heart races, patient states he also has a ring like radames on his neck x 2 years     Johnson HULL Michelet Samano. is a 37 year old male who presents with a chief complaint of SOB, chest pain, racing heart.    Monday, had a change in job and has been stressful.  Went to new location at end of April and the people were not supportive.  Will be transferring back to prior location.  Works in restaurant industry. Struggling with transportation and obtain new car but this one is having issues too.       Will develop heart racing, will overthink about events of the day and gets stress about having to go back to work.  Racing heart will last about 30 minutes, will drink water and take deep breaths and goes away.  Denies any dizziness.  Will get more SOB when heart racing.  Denies any symptoms when not stressed, no respiratory issues.  Positive TOB use.    Denies any thoughts of self harm  Denies any prior history of anxiety    Has rash on neck for the past 2 years, was small and had grown.  Denies any pain or itchiness.    Overall healthy with no chronic medications, no family history of mental health.    No past medical history on file.  Current Outpatient Medications   Medication Sig Dispense Refill     amoxicillin-clavulanate (AUGMENTIN) 875-125 MG per tablet Take 1 tablet by mouth 2 times daily 28 tablet 0     metoclopramide (REGLAN) 10 MG tablet Take 1 tablet (10 mg) by mouth 4 times daily as needed (nausea/vomiting) 10 tablet 0     oxyCODONE (ROXICODONE) 5 MG immediate release tablet Take 1-2 tablets (5-10 mg) by mouth every 6 hours as needed for moderate to severe pain 20 tablet 0     oxyCODONE-acetaminophen (PERCOCET) 5-325 MG per tablet Take 1 tablet by mouth every 6 hours as needed for moderate to severe pain 10 tablet 0     Social History     Tobacco Use     Smoking status: Every Day     Types: Cigarettes  "    Smokeless tobacco: Never   Vaping Use     Vaping status: Some Days   Substance Use Topics     Alcohol use: Not on file       ROS:  Review of systems negative except as stated above.    EXAM:   /82   Pulse 75   Temp 98  F (36.7  C) (Tympanic)   Resp 16   Ht 1.702 m (5' 7\")   Wt 70.3 kg (155 lb)   SpO2 100%   BMI 24.28 kg/m    GENERAL APPEARANCE: healthy, alert and no distress  CHEST: clear to auscultation  CV: regular rate and rhythm  EXTREMITIES: peripheral pulses normal  SKIN: circular patch on anterior neck with slightly more raised border, faint scaling.  PSYCH:alert, affect bright    EKG  - sinus marlee, rate 51, no ST c/w ischemia, no PCVs    Results for orders placed or performed in visit on 05/26/23   CBC with platelets and differential     Status: None   Result Value Ref Range    WBC Count 9.4 4.0 - 11.0 10e3/uL    RBC Count 5.22 4.40 - 5.90 10e6/uL    Hemoglobin 14.9 13.3 - 17.7 g/dL    Hematocrit 46.6 40.0 - 53.0 %    MCV 89 78 - 100 fL    MCH 28.5 26.5 - 33.0 pg    MCHC 32.0 31.5 - 36.5 g/dL    RDW 14.1 10.0 - 15.0 %    Platelet Count 315 150 - 450 10e3/uL    % Neutrophils 64 %    % Lymphocytes 27 %    % Monocytes 7 %    % Eosinophils 2 %    % Basophils 0 %    % Immature Granulocytes 0 %    Absolute Neutrophils 6.0 1.6 - 8.3 10e3/uL    Absolute Lymphocytes 2.5 0.8 - 5.3 10e3/uL    Absolute Monocytes 0.7 0.0 - 1.3 10e3/uL    Absolute Eosinophils 0.2 0.0 - 0.7 10e3/uL    Absolute Basophils 0.0 0.0 - 0.2 10e3/uL    Absolute Immature Granulocytes 0.0 <=0.4 10e3/uL   CBC with platelets and differential     Status: None    Narrative    The following orders were created for panel order CBC with platelets and differential.  Procedure                               Abnormality         Status                     ---------                               -----------         ------                     CBC with platelets and d...[[id number]]                      Final result                 Please view " results for these tests on the individual orders.         ASSESSMENT/PLAN:  (R00.2) Palpitations  (primary encounter diagnosis)  Plan: CBC with platelets and differential,         Comprehensive metabolic panel (BMP + Alb, Alk         Phos, ALT, AST, Total. Bili, TP), Magnesium,         TSH with free T4 reflex, EKG 12-lead complete         w/read - Clinics, hydrOXYzine (VISTARIL) 25 MG         capsule            (F43.9) Situational stress  Plan: hydrOXYzine (VISTARIL) 25 MG capsule            (B35.4) Tinea corporis  Comment: neck  Plan: ketoconazole (NIZORAL) 2 % external cream            Reviewed symptom presentation and discussed that further evaluation may be needed with primary provider, discussed that does appear to be due to situational stressors and hopeful that work environment change will improve situation.  RX hydroxyzine given to take for stress/anxiety.  EKG and labs reviewed with patient and overall reassuring, will follow up on pending labs and notify if any abnormalities.      Reviewed rash most likely fungal, RX ketoconazole cream to area    Encourage TOB cessation.    Encourage to establish with primary provider for recheck in 2 week    Fritz Tohmpson MD  May 26, 2023 3:45 PM

## 2024-01-26 ENCOUNTER — HOSPITAL ENCOUNTER (EMERGENCY)
Facility: CLINIC | Age: 39
Discharge: HOME | End: 2024-01-26
Attending: EMERGENCY MEDICINE | Admitting: EMERGENCY MEDICINE

## 2024-01-26 VITALS
HEART RATE: 98 BPM | DIASTOLIC BLOOD PRESSURE: 89 MMHG | TEMPERATURE: 97.4 F | SYSTOLIC BLOOD PRESSURE: 144 MMHG | OXYGEN SATURATION: 100 % | RESPIRATION RATE: 18 BRPM

## 2024-01-26 DIAGNOSIS — M72.2 PLANTAR FASCIITIS: ICD-10-CM

## 2024-01-26 LAB
ANION GAP SERPL CALCULATED.3IONS-SCNC: 10 MMOL/L (ref 7–15)
BASOPHILS # BLD AUTO: 0 10E3/UL (ref 0–0.2)
BASOPHILS NFR BLD AUTO: 0 %
BUN SERPL-MCNC: 8.4 MG/DL (ref 6–20)
CALCIUM SERPL-MCNC: 8.8 MG/DL (ref 8.6–10)
CHLORIDE SERPL-SCNC: 101 MMOL/L (ref 98–107)
CREAT SERPL-MCNC: 0.89 MG/DL (ref 0.67–1.17)
DEPRECATED HCO3 PLAS-SCNC: 27 MMOL/L (ref 22–29)
EGFRCR SERPLBLD CKD-EPI 2021: >90 ML/MIN/1.73M2
EOSINOPHIL # BLD AUTO: 0.2 10E3/UL (ref 0–0.7)
EOSINOPHIL NFR BLD AUTO: 2 %
ERYTHROCYTE [DISTWIDTH] IN BLOOD BY AUTOMATED COUNT: 14 % (ref 10–15)
GLUCOSE SERPL-MCNC: 78 MG/DL (ref 70–99)
HCT VFR BLD AUTO: 40.6 % (ref 40–53)
HGB BLD-MCNC: 13.6 G/DL (ref 13.3–17.7)
IMM GRANULOCYTES # BLD: 0 10E3/UL
IMM GRANULOCYTES NFR BLD: 0 %
LYMPHOCYTES # BLD AUTO: 3.5 10E3/UL (ref 0.8–5.3)
LYMPHOCYTES NFR BLD AUTO: 36 %
MCH RBC QN AUTO: 29.1 PG (ref 26.5–33)
MCHC RBC AUTO-ENTMCNC: 33.5 G/DL (ref 31.5–36.5)
MCV RBC AUTO: 87 FL (ref 78–100)
MONOCYTES # BLD AUTO: 0.9 10E3/UL (ref 0–1.3)
MONOCYTES NFR BLD AUTO: 10 %
NEUTROPHILS # BLD AUTO: 5.1 10E3/UL (ref 1.6–8.3)
NEUTROPHILS NFR BLD AUTO: 52 %
NRBC # BLD AUTO: 0 10E3/UL
NRBC BLD AUTO-RTO: 0 /100
PLATELET # BLD AUTO: 331 10E3/UL (ref 150–450)
POTASSIUM SERPL-SCNC: 3.5 MMOL/L (ref 3.4–5.3)
RBC # BLD AUTO: 4.68 10E6/UL (ref 4.4–5.9)
SODIUM SERPL-SCNC: 138 MMOL/L (ref 135–145)
URATE SERPL-MCNC: 5.9 MG/DL (ref 3.4–7)
WBC # BLD AUTO: 9.8 10E3/UL (ref 4–11)

## 2024-01-26 PROCEDURE — 99283 EMERGENCY DEPT VISIT LOW MDM: CPT

## 2024-01-26 PROCEDURE — 84550 ASSAY OF BLOOD/URIC ACID: CPT | Performed by: EMERGENCY MEDICINE

## 2024-01-26 PROCEDURE — 36415 COLL VENOUS BLD VENIPUNCTURE: CPT | Performed by: EMERGENCY MEDICINE

## 2024-01-26 PROCEDURE — 85025 COMPLETE CBC W/AUTO DIFF WBC: CPT | Performed by: EMERGENCY MEDICINE

## 2024-01-26 PROCEDURE — 80048 BASIC METABOLIC PNL TOTAL CA: CPT | Performed by: EMERGENCY MEDICINE

## 2024-01-26 ASSESSMENT — ACTIVITIES OF DAILY LIVING (ADL): ADLS_ACUITY_SCORE: 33

## 2024-01-26 NOTE — Clinical Note
Johnson Tafoya was seen and treated in our emergency department on 1/26/2024.  He may return to work on 01/31/2024.       If you have any questions or concerns, please don't hesitate to call.      Jarrod Joyce MD

## 2024-01-27 NOTE — ED PROVIDER NOTES
History     Chief Complaint:  Foot Pain       HPI   Johnson Tafoya Jr. is a 38 year old male who presents with left foot pain. For the past week the patient has been having pain in the sole of his left foot that radiates into his toes, worse when moving and using it. 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     Independent Historian:   None - Patient Only    Review of External Notes:   None      Medications:    Reglan     Past Medical History:    The patient had no past medical history on file     Physical Exam   Patient Vitals for the past 24 hrs:   BP Temp Temp src Pulse Resp SpO2   01/26/24 2237 (!) 143/92 97.4  F (36.3  C) Temporal 100 20 97 %        Physical Exam  General: Patient is alert, awake and interactive when I enter the room  Head: The scalp, face, and head appear normal  Eyes: Conjunctivae are normal  ENT: The nose is normal, Pinnae are normal, External acoustic canals are normal  Neck: Trachea midline  CV: Pulses are normal.   Resp: No respiratory distress   Musc: Normal muscular tone, moving all extremities.  Left Foot Exam:  Inspection: no swelling, ecchymosis   Palpation: TTP over the sole of the foot, along the plantar fascia   ROM:  normal  Sensation: Intact to light touch distally all toes  Cap refill all toes:   < 2 seconds distally.   PT/DP Pulses  Normal left foot/ankle  Skin: No rash or lesions noted  Neuro:  Speech is normal and fluent. Face is symmetric.   Psych: Normal affect.  Appropriate interactions.    Emergency Department Course     Laboratory:  Labs Ordered and Resulted from Time of ED Arrival to Time of ED Departure   BASIC METABOLIC PANEL - Normal       Result Value    Sodium 138      Potassium 3.5      Chloride 101      Carbon Dioxide (CO2) 27      Anion Gap 10      Urea Nitrogen 8.4      Creatinine 0.89      GFR Estimate >90      Calcium 8.8      Glucose 78     URIC ACID - Normal    Uric Acid 5.9      CBC WITH PLATELETS AND DIFFERENTIAL    WBC Count 9.8      RBC Count 4.68      Hemoglobin 13.6      Hematocrit 40.6      MCV 87      MCH 29.1      MCHC 33.5      RDW 14.0      Platelet Count 331      % Neutrophils 52      % Lymphocytes 36      % Monocytes 10      % Eosinophils 2      % Basophils 0      % Immature Granulocytes 0      NRBCs per 100 WBC 0      Absolute Neutrophils 5.1      Absolute Lymphocytes 3.5      Absolute Monocytes 0.9      Absolute Eosinophils 0.2      Absolute Basophils 0.0      Absolute Immature Granulocytes 0.0      Absolute NRBCs 0.0        Emergency Department Course & Assessments:       Interventions:  Medications - No data to display     Assessments:  2341 I examined the patient and obtained history as shown above    Independent Interpretation (X-rays, CTs, rhythm strip):  None    Consultations/Discussion of Management or Tests:  None        Social Determinants of Health affecting care:   None    Disposition:  The patient was discharged.     Impression & Plan    CMS Diagnoses: None    Medical Decision Making:  Johnson Tafoya Jr. is a 38 year old male presented for evaluation of left foot pain. He has been having heel pain for the last week. It is constantly bothering him, however, after rest, the first step is the most intense pain. His exam is consistent with plantar fasciitis. There is no evidence for infection. He has no indication for foreign body. No evidence of gout or septic arthritis. He has normal distal sensation and circulation. Patient is advised on exercises and stretches, advised to take anti-inflammatories. He will follow up with podiatry in one week's time if no improvement, immediately worsening. Advised for immediate return to the UR/ER if he develops increasing pain, numbness, weakness, tingling, or other concerns.    Diagnosis:    ICD-10-CM    1. Plantar fasciitis  M72.2 Physical Therapy Referral            Scribe Disclosure:  Spike DALLAS, am serving as a  scribe at 11:27 PM on 1/26/2024 to document services personally performed by Jarrod Joyce MD based on my observations and the provider's statements to me.     1/26/2024   Jarrod Joyce MD Battista, Christopher Joseph, MD  01/27/24 0047

## 2024-01-27 NOTE — ED TRIAGE NOTES
Left foot pain for several days. Getting more painful. Denies trauma and swelling. Possible redness. Denies gout history

## 2024-07-13 ENCOUNTER — HOSPITAL ENCOUNTER (EMERGENCY)
Facility: CLINIC | Age: 39
Discharge: HOME | End: 2024-07-13
Attending: EMERGENCY MEDICINE | Admitting: EMERGENCY MEDICINE

## 2024-07-13 VITALS
TEMPERATURE: 98.4 F | RESPIRATION RATE: 20 BRPM | OXYGEN SATURATION: 98 % | SYSTOLIC BLOOD PRESSURE: 132 MMHG | DIASTOLIC BLOOD PRESSURE: 85 MMHG | HEART RATE: 103 BPM

## 2024-07-13 DIAGNOSIS — F43.21 GRIEF REACTION: ICD-10-CM

## 2024-07-13 DIAGNOSIS — T14.91XA SUICIDE ATTEMPT (H): ICD-10-CM

## 2024-07-13 PROBLEM — F43.23 ADJUSTMENT DISORDER WITH MIXED ANXIETY AND DEPRESSED MOOD: Status: ACTIVE | Noted: 2024-07-13

## 2024-07-13 PROCEDURE — 99285 EMERGENCY DEPT VISIT HI MDM: CPT

## 2024-07-13 ASSESSMENT — ACTIVITIES OF DAILY LIVING (ADL)
ADLS_ACUITY_SCORE: 35

## 2024-07-13 NOTE — ED PROVIDER NOTES
Emergency Department Note      History of Present Illness     Chief Complaint   Suicidal      HPI   Johnson Tafoya Jr. is a 38 year old male without other medical history, who presents following suicide attempt.  Patient states that he is feeling stressed by multiple events in his life, including the impending anniversary of his grandmother's birthday, as well as a  for a friend that he will not be able to attend.  Wife states that this afternoon, the patient became agitated, and was shouting.  She was trying to get her daughters out of the home, when she realized that the patient had gone to the garage.  She found him there with an extension cord from the ceiling, and starting to wrap the cord around his neck.  He did not ever strangulated himself, as she was able to get the cord and wrapped from around him before he could do so.  She called 911, at which point the patient left in his vehicle.  He was chased down by police and paramedics, who then transported him to the emergency department.  Patient states that he has not been having thoughts of suicide, and acted impulsively today.  He denies having a firearm at home.  He has not had any previous suicide attempts.  He admits to occasional alcohol and tobacco use, but denies any recreational drugs.    Independent Historian   Wife and sister as detailed above.    Review of External Notes   Available records negative for previous psychiatric evaluation.    Past Medical History     Medical History and Problem List   none    Medications   amoxicillin-clavulanate (AUGMENTIN) 875-125 MG per tablet  hydrOXYzine (VISTARIL) 25 MG capsule  ketoconazole (NIZORAL) 2 % external cream  metoclopramide (REGLAN) 10 MG tablet  oxyCODONE (ROXICODONE) 5 MG immediate release tablet  oxyCODONE-acetaminophen (PERCOCET) 5-325 MG per tablet        Surgical History   none    Physical Exam     Patient Vitals for the past 24 hrs:   BP Temp Temp src Pulse Resp SpO2   24 1559  132/85 98.4  F (36.9  C) Oral 103 20 98 %     Physical Exam  General: alert, lying comfortably on gurney  HENT: mucous membranes moist  Neck: No bruising, no abrasions.  Normal voice.  No tenderness to the neck or trachea.  CV: regular rate, regular rhythm  Resp: normal effort, clear throughout, no crackles or wheezing  GI: abdomen soft and nontender, no guarding  MSK: no bony tenderness  Skin: appropriately warm and dry  Extremities: no edema, calves non-tender  Neuro: alert, clear speech, oriented  Psych: normal mood and affect, cooperative.  Denies suicidal ideation or homicidal ideation.  No hallucinations.  Normal thought process.      Diagnostics     EKG   none    Independent Interpretation   None    ED Course      Medications Administered   Medications - No data to display    Procedures   Procedures     Discussion of Management   DEC    ED Course   ED Course as of 07/13/24 1811   Sat Jul 13, 2024   1729 DEC , Kirit: patient struggling with grief, anger, sadness he has never worked through.  Reports openness to seeking help.  Willing to safety plan with .  Recommends discharge.  Wife is SW; wife will help patient get scheduled with therapy.  Plan that they will monitor patient at home with other family until he is able to be evaluated with therapist.  No lethal means at home.  No longer suicidal.   1811 I reassessed the patient.       Optional/Additional Documentation  None    Medical Decision Making / Diagnosis     CMS Diagnoses: None    MIPS       None    MDM   Johnson Tafoya Jr. is a 38 year old male without other mental health history, who presents today following a suicide attempt.  On exam, the patient is alert, sober, without signs of neck trauma or other trauma.  He admits that he was impulsive today and attempting to harm himself, but denies any active suicidal ideation.  He has been dealing with multiple stressors, and severe grief regarding loss of a friend, as well as the  anniversary of his grandmother's birthday.  He does not have previous mental health history, and is highly motivated to do safety planning at home, with wife who is a .  They will plan to set up therapy appointments, and declined the DEC  to set up appointments today.  He does not have access to guns in the home.  He is future oriented, and looks forward to going back home to spend time with his daughters.  At this point, I think he is safe to discharge home, but stressed the importance of seeking help from a therapist.  Return to the ED at any point if he feels unsafe at home, has spiraling depression symptoms, or for other concerns.    Disposition   The patient was discharged.     Diagnosis     ICD-10-CM    1. Grief reaction  F43.21       2. Suicide attempt (H)  T14.91XA            Discharge Medications   New Prescriptions    No medications on file         MD Noni Luque Tracy Lynn, MD  07/13/24 5733

## 2024-07-13 NOTE — ED TRIAGE NOTES
Brought in by ambulance. Wife called ems. Found patient attempting to hang himself in garage. Endorses to EMS multiple stressors in life. VSS.      Triage Assessment (Adult)       Row Name 07/13/24 3172          Triage Assessment    Airway WDL WDL        Respiratory WDL    Respiratory WDL WDL        Skin Circulation/Temperature WDL    Skin Circulation/Temperature WDL WDL        Cardiac WDL    Cardiac WDL WDL        Peripheral/Neurovascular WDL    Peripheral Neurovascular WDL WDL        Cognitive/Neuro/Behavioral WDL    Cognitive/Neuro/Behavioral WDL WDL

## 2024-07-13 NOTE — CONSULTS
Diagnostic Evaluation Consultation  Crisis Assessment    Patient Name: Johnson Tafoya Jr.  Age:  38 year old  Legal Sex: male  Gender Identity: male  Pronouns:   Race: Black or   Ethnicity: Not  or   Language: English      Patient was assessed: Virtual: [employer]   Crisis Assessment Start Date: 24  Crisis Assessment Start Time:   Crisis Assessment Stop Time: 1723  Patient location: [facility] EMERGENCY DEPT                                 Referral Data and Chief Complaint  Johnson Tafoya Jr. presents to the ED via EMS. Patient is presenting to the ED for the following concerns: Suicidal ideation, Depression, Worsening psychosocial stress, Suicide attempt.   Factors that make the mental health crisis life threatening or complex are:  Pt presents to ED via ambulance after wife interrupted pt's actions of picking up and using an electrical cord to strangle self.  Wife called 911 and pt left in vehicle.  EMS and police stopped pt and brought to ED for mental health evaluation.  Upon interview, pt is seated on bed in ED with wife, father and sister in room.  Pt is cooperative and engages in eye contact.  Pt admits to several stressors and recent events that have caused him anger and sadness.  Pt reports his best friend passed away last week and pt won't be able to attend the  due to work schedule.  He also expresses sadness about his  grandmother whose birthday is tomorrow.  Pt reports he was feeling anger and sadness and on his way out of the house reportedly saw an electrical cord lying on floor and picked it up, impulsively thinking he would use it to kill himself by strangling or hanging self.  Pt denies prior thoughts of suicide or preparation.  Pt admits to having thoughts he needs to process but has been afraid to admit this.  Pt reports he does not want to die, has children and wife to live for.. No history of mental health issues or  care.      Informed Consent and Assessment Methods  Explained the crisis assessment process, including applicable information disclosures and limits to confidentiality, assessed understanding of the process, and obtained consent to proceed with the assessment.  Assessment methods included conducting a formal interview with patient, review of medical records, collaboration with medical staff, and obtaining relevant collateral information from family and community providers when available.  : done     Patient response to interventions: acceptance expressed  Coping skills were attempted to reduce the crisis:  Pt reports he has kept feelings to himself but now willing to start therapy     History of the Crisis   Pt reports grandmother passed away 2-3 years ago and he was very close with her.  Friend passed away a week ago.  Pt denies  any hx of mental health issues.    Brief Psychosocial History  Family:  , Children yes  Support System:  Wife, Sibling(s), Parent(s)  Employment Status:  employed full-time  Source of Income:  salary/wages  Financial Environmental Concerns:     Current Hobbies:  family functions, television/movies/videos, social media/computer activities  Barriers in Personal Life:       Significant Clinical History  Current Anxiety Symptoms:  anxious  Current Depression/Trauma:  thoughts of death/suicide, impaired decision making, sadness  Current Somatic Symptoms:  anxious  Current Psychosis/Thought Disturbance:  impulsive  Current Eating Symptoms:     Chemical Use History:  Alcohol: Social   Past diagnosis:  No known past diagnosis  Family history:  No known history of mental health or chemical health concerns  Past treatment:  No known formal treatment attempts  Details of most recent treatment:  none  Other relevant history:          Collateral Information  Is there collateral information: Yes     Collateral information name, relationship, phone number:  Sharita Penaloza [phone], Father,  Sister    What happened today: Pt was angry/upset and when wife was moving children to different area, noticed pt had gone out to garage.  Wife found pt holding electrical cord that appeared he was going to use to strangle/hang self.  Wife took cord from pt and called 911.  Pt left in car and was stopped by EMS and police.  Police told wife pt was very cooperative.     What is different about patient's functioning: suicidal thoughts/gesture - no history of before today     Concern about alcohol/drug use:      What do you think the patient needs:      Has patient made comments about wanting to kill themselves/others: no    If d/c is recommended, can they take part in safety/aftercare planning:  yes    Additional collateral information:  Family members present report willingness to support pt and will plan to stay.  Stressors include job change, loss of friend, grandmother's death     Risk Assessment  [place] Suicide Severity Rating Scale Full Clinical Version:  Suicidal Ideation  Q6 Suicide Behavior (Lifetime): yes          [place] Suicide Severity Rating Scale Recent:   Suicidal Ideation (Recent)  Q1 Wished to be Dead (Past Month): yes  Q2 Suicidal Thoughts (Past Month): yes  Q3 Suicidal Thought Method: yes  Q4 Suicidal Intent without Specific Plan: yes  Q5 Suicide Intent with Specific Plan: yes  If yes to Q6, within past 3 months?: yes  Level of Risk per Screen: high risk  Intensity of Ideation (Recent)  Description of Most Severe Ideation (Past 1 Month): today only  Frequency (Past 1 Month): Less than once a week  Duration (Past 1 Month): Fleeting, few seconds or minutes  Controllability (Past 1 Month): Does not attempt to control thoughts  Deterrents (Past 1 Month): Deterrents definitely stopped you from attempting suicide  Reasons for Ideation (Past 1 Month): Completely to end or stop the pain (You couldn't go on living with the pain or how you were feeling)  Suicidal Behavior (Recent)  Actual Attempt (Past 3  Months): No  Has subject engaged in non-suicidal self-injurious behavior? (Past 3 Months): No  Interrupted Attempts (Past 3 Months): Yes  Total Number of Interrupted Attempts (Past 3 Months): 1  Aborted or Self-Interrupted Attempt (Past 3 Months): No  Preparatory Acts or Behavior (Past 3 Months): No    Environmental or Psychosocial Events: loss of a loved one, other life stressors  Protective Factors: Protective Factors: strong bond to family unit, community support, or employment, intact marriage or domestic partnership, responsibilities and duties to others, including pets and children, sense of importance of health and wellness, lives in a responsibly safe and stable environment, reality testing ability, constructive use of leisure time, enjoyable activities, resilience    Does the patient have thoughts of harming others? Feels Like Hurting Others: no  Previous Attempt to Hurt Others: no  Is the patient engaging in sexually inappropriate behavior?: no    Is the patient engaging in sexually inappropriate behavior?  no        Mental Status Exam   Affect: Blunted  Appearance: Appropriate  Attention Span/Concentration: Attentive  Eye Contact: Engaged    Fund of Knowledge: Appropriate   Language /Speech Content: Fluent  Language /Speech Volume: Normal  Language /Speech Rate/Productions: Normal  Recent Memory: Intact  Remote Memory: Intact  Mood: Anxious, Angry, Sad  Orientation to Person: Yes   Orientation to Place: Yes  Orientation to Time of Day: Yes  Orientation to Date: Yes     Situation (Do they understand why they are here?): Yes  Psychomotor Behavior: Normal  Thought Content: Suicidal  Thought Form: Intact     Mini-Cog Assessment  Number of Words Recalled:    Clock-Drawing Test:     Three Item Recall:    Mini-Cog Total Score:       Medication  Psychotropic medications:   Medication Orders - Psychiatric (From admission, onward)      None             Current Care Team  Patient Care Team:  [facility], [facility]  Kaity Cooper as PCP - General    Diagnosis  Patient Active Problem List   Diagnosis Code    Adjustment disorder with mixed anxiety and depressed mood F43.23       Primary Problem This Admission  Active Hospital Problems    *Adjustment disorder with mixed anxiety and depressed mood    F43.23    Clinical Summary and Substantiation of Recommendations   After therapeutic assessment, intervention and aftercare planning by ED care team and LM and in consultation with attending provider, the patient's circumstances and mental state were appropriate for outpatient management.  Pt repeatedly states the suicidal thought and act was impulsive.  Pt denies that he still has suicidal thoughts and expresses insight and relief that his family knows he was struggling and it is ok to move forward getting some help.  Factors to mitigate risk include:  pt is willing to engage in OP mental health services, family plans to surround pt with support physically and emotionally, pt engages in safety planning, strong family support, intact marriage, responsibility to children, future oriented.  Pt and wife to follow up with pt's [facility] for therapy services.  Pt willing to return to ED or use crisis line if suicidal thoughts return.  MD updated.       Patient coping skills attempted to reduce the crisis:  Pt reports he has kept feelings to himself but now willing to start therapy    Disposition  Recommended disposition: Individual Therapy        Reviewed case and recommendations with attending provider. Attending Name: Dr Cheney       Attending concurs with disposition: yes       Patient and/or validated legal guardian concurs with disposition:   yes       Final disposition:  discharge    Legal status on admission:      Assessment Details   Total duration spent with the patient: 37 min     CPT code(s) utilized: 32252 - Psychotherapy for Crisis - 60 (30-74*) min    Catherine Bradley Psychotherapist  [facility] - Triage & Transition Services  Callback:  [phone]

## 2024-07-13 NOTE — DISCHARGE INSTRUCTIONS
Pt plans to contact [facility] through employer for therapy needs.    Return to the ED at any point if you feel unsafe at home, if you feel at risk for harming yourself, if you feel that your depression or grief is unmanageable.

## 2024-07-13 NOTE — ED NOTES
JENNIFER @ 1807. Patient stated understanding of hold status. Patient searched. Belonging in [facility] office. Declined changing into  scrubs.

## 2024-07-13 NOTE — PLAN OF CARE
Johnson Tafoya Jr.  July 13, 2024  Plan of Care Hand-off Note     Patient Care Path: discharge    Plan for Care:   After therapeutic assessment, intervention and aftercare planning by ED care team and LM and in consultation with attending provider, the patient's circumstances and mental state were appropriate for outpatient management.  Pt repeatedly states the suicidal thought and act was impulsive.  Pt denies that he still has suicidal thoughts and expresses insight and relief that his family knows he was struggling and it is ok to move forward getting some help.  Factors to mitigate risk include:  pt is willing to engage in OP mental health services, family plans to surround pt with support physically and emotionally, pt engages in safety planning, strong family support, intact marriage, responsibility to children, future oriented.  Pt and wife to follow up with pt's [facility] for therapy services.  Pt willing to return to ED or use crisis line if suicidal thoughts return.  MD updated.    Identified Goals and Safety Issues: Pt presents to ED due to safety concerns, interrupted act to hang/strangle self    Overview:  Wife Ca [phone] Father, Sister          Legal Status:      Psychiatry Consult:       Updated Attending MD regarding plan of care.           Catherine Bradley